# Patient Record
Sex: FEMALE | Race: WHITE | NOT HISPANIC OR LATINO | Employment: OTHER | URBAN - NONMETROPOLITAN AREA
[De-identification: names, ages, dates, MRNs, and addresses within clinical notes are randomized per-mention and may not be internally consistent; named-entity substitution may affect disease eponyms.]

---

## 2018-09-18 ENCOUNTER — OFFICE VISIT (OUTPATIENT)
Dept: ORTHOPEDIC SURGERY | Facility: CLINIC | Age: 63
End: 2018-09-18

## 2018-09-18 VITALS — HEIGHT: 55 IN | WEIGHT: 139 LBS | BODY MASS INDEX: 32.17 KG/M2

## 2018-09-18 DIAGNOSIS — G89.29 CHRONIC RADICULAR LUMBAR PAIN: Primary | ICD-10-CM

## 2018-09-18 DIAGNOSIS — M54.16 CHRONIC RADICULAR LUMBAR PAIN: Primary | ICD-10-CM

## 2018-09-18 DIAGNOSIS — M17.11 PRIMARY OSTEOARTHRITIS OF RIGHT KNEE: ICD-10-CM

## 2018-09-18 DIAGNOSIS — M17.12 PRIMARY OSTEOARTHRITIS OF LEFT KNEE: ICD-10-CM

## 2018-09-18 DIAGNOSIS — M16.11 PRIMARY OSTEOARTHRITIS OF RIGHT HIP: ICD-10-CM

## 2018-09-18 PROCEDURE — 99203 OFFICE O/P NEW LOW 30 MIN: CPT | Performed by: ORTHOPAEDIC SURGERY

## 2018-09-18 RX ORDER — CYCLOBENZAPRINE HCL 10 MG
10 TABLET ORAL 3 TIMES DAILY PRN
COMMUNITY

## 2018-09-18 RX ORDER — SENNOSIDES 8.6 MG
650 CAPSULE ORAL EVERY 8 HOURS PRN
COMMUNITY

## 2018-09-18 NOTE — PROGRESS NOTES
Chief Complaint   Patient presents with   • Right Knee - Pain   • Left Knee - Pain             HPI  The patient is here today for right and left knee pain and discomfort.  The patient states she is been having pain and discomfort that starts in the lumbar spine.  The pain is a dull constant ache that never really gets better.  She denies any history of trauma.  This pain has been going on for at least a couple of years.  The patient states the pain radiates into the hip the buttock and thigh.  The right hip is significantly symptomatic for the patient as well.  Both the knees are developing a varus deformity.  She has difficulty in squatting on the ground because of the effusion in the knees.  She has difficulty going up and down the steps as well.  She states that the combination of the spinal pain, the hip and knee pain is affecting her mobility in a negative fashion.  She states that she would like to be more active and would like to walk for exercise but is unable to do so because of a combination of these joints hurting her.  She has tried anti-inflammatory medication and a brace for the knee which are somewhat helpful in managing the symptoms.  She does not have any risk factors for avascular necrosis.  The patient states that she would like to defer surgical replacement of the knees at this point but she does understand that as her symptoms progress that might be her only option to improve her quality of life.        Allergies   Allergen Reactions   • Codeine Unknown (See Comments)     Unknown          Social History     Social History   • Marital status:      Spouse name: N/A   • Number of children: N/A   • Years of education: N/A     Occupational History   • Not on file.     Social History Main Topics   • Smoking status: Current Every Day Smoker   • Smokeless tobacco: Never Used   • Alcohol use Not on file   • Drug use: Unknown   • Sexual activity: Not on file     Other Topics Concern   • Not on file      Social History Narrative   • No narrative on file       No family history on file.    No past surgical history on file.    No past medical history on file.        There were no vitals filed for this visit.          Review of Systems   Constitutional: Negative.    HENT: Negative.    Eyes: Negative.    Respiratory: Negative.    Cardiovascular: Negative.    Gastrointestinal: Negative.    Endocrine: Negative.    Genitourinary: Negative.    Musculoskeletal: Positive for gait problem and joint swelling.   Skin: Negative.    Allergic/Immunologic: Negative.    Hematological: Negative.    Psychiatric/Behavioral: Negative.            Physical Exam   Constitutional: She is oriented to person, place, and time. She appears well-nourished.   HENT:   Head: Atraumatic.   Eyes: EOM are normal.   Neck: Neck supple.   Cardiovascular: Normal heart sounds and intact distal pulses.    Pulmonary/Chest: Breath sounds normal.   Abdominal: Bowel sounds are normal.   Musculoskeletal: She exhibits edema and tenderness.   Neurological: She is alert and oriented to person, place, and time.   Skin: Skin is dry. Capillary refill takes 2 to 3 seconds.   Psychiatric: She has a normal mood and affect. Her behavior is normal. Judgment and thought content normal.   Nursing note and vitals reviewed.              Joint/Body Part Specific Exam:  bilateral knee. Patient has crepitus throughout range of motion. Positive patellar grind test. Mild effusion. Lachman is negative. Pivot shift is negative. Anterior and posterior drawer signs are negative. Significant joint line tenderness is noted on the medial aspect of the knee. Patient has a varus orientation of the knee. There is fullness and tenderness in the Popliteal fossa. Mild distention of a Popliteal cyst is noted in this location. Range of motion in flexion is from 0- 110 degrees. Neurovascular status is intact.  Dorsalis pedis and posterior tibial artery pulses are palpable. Common peroneal nerve  function is well preserved. Patient's gait is cautious and antalgic. Skin and soft tissues are mildly swollen, consistent with synovitis and effusion. The patient has a significant limp with the first few steps after starting the gait cycle. Getting out of a chair takes a lot of effort due to pain on knee flexion.    right hip. Neurovascular status is intact. Patient has a distant limp on the affected side. Internal and external rotations are associated with pain and discomfort. Anterior joint line pain and tenderness is significant. Stinchfield sign is positive. Figure of 4 sign is positive. Patient is unable to perform an active straight leg raise exam. Greater trochanter is tender. Crossover adduction test is positive. Cross body adduction is limited and painful for the patient. Patient has very significant limp and joint line tenderness anteriorly, posteriorly and medially. Dorsalis pedis and posterior tibial artery pulses are palpable. Common peroneal nerve function is well preserved.    Lumbar Spine: The overlying skin and soft tissues are mildly swollen. Deep tendon reflexes are bilaterally, symmetric and equal.  No long tract signs are noted. There is No evidence of myelopathy. No bowel or bladder deficit noted. Mild spasm of erector spinae muscle is noted. bilaterally sided rotation is associated with pain and discomfort. Straight leg raise test is positive to 60 degrees. Contralateral straight leg raise is negative. Pain radiates to buttock and thigh. Get up and go is slow due to the lumbar pain.No objective motor or sensory function loss is noted.   X-RAY Report:  Images from an outside facility are reviewed.  There is a significant narrowing of the medial joint space of both the knees.  The patellofemoral distance is almost completely obliterated.  A varus deformity of both the knees is noted.  X-rays of the hip show bilateral narrowing of the joint space with osteophyte formation.  There is no evidence  of subchondral collapse of the femoral head.  These images are discussed with the patient and are the bases of my recommendation for nonoperative care at this point.          Diagnostics:            Adela was seen today for pain and pain.    Diagnoses and all orders for this visit:    Chronic radicular lumbar pain    Primary osteoarthritis of right hip    Primary osteoarthritis of left knee    Primary osteoarthritis of right knee    Other orders  -     SCANNED - IMAGING            Procedures          I provided this patient with educational materials regarding exercise and bone health.        Plan: [place] exercise program for the low back pain.    She is already being treated for her osteoporosis.    Tablet diclofenac 75 mg tab 1 by mouth twice a day for pain swelling and discomfort.    Use a supportive brace to the knee to prevent the knees from buckling and giving out.    Intra-articular steroid injection and Synvisc Visco supplementation discussed with the patient at length.    Nonoperative care for the low back pain at this point.    Discussed with the patient about an epidural block with MOHSEN at the pain clinic.    Nonoperative care for the hip pathology at this point.    The patient will eventually need bilateral knee replacement surgery and we have discussed about the risks and benefits of that surgical intervention with the patient as well.  The patient will let me know when she is ready for that intervention.    Follow-up in my office in 6 months for further evaluation.          CC To Yamileth Lugo APRN

## 2018-09-26 PROBLEM — G89.29 CHRONIC RADICULAR LUMBAR PAIN: Status: ACTIVE | Noted: 2018-09-26

## 2018-09-26 PROBLEM — M17.12 PRIMARY OSTEOARTHRITIS OF LEFT KNEE: Status: ACTIVE | Noted: 2018-09-26

## 2018-09-26 PROBLEM — M54.16 CHRONIC RADICULAR LUMBAR PAIN: Status: ACTIVE | Noted: 2018-09-26

## 2018-09-26 PROBLEM — M17.11 PRIMARY OSTEOARTHRITIS OF RIGHT KNEE: Status: ACTIVE | Noted: 2018-09-26

## 2018-09-26 PROBLEM — M16.11 PRIMARY OSTEOARTHRITIS OF RIGHT HIP: Status: ACTIVE | Noted: 2018-09-26

## 2021-07-19 ENCOUNTER — APPOINTMENT (OUTPATIENT)
Dept: GENERAL RADIOLOGY | Facility: HOSPITAL | Age: 66
End: 2021-07-19

## 2021-07-19 ENCOUNTER — APPOINTMENT (OUTPATIENT)
Dept: CT IMAGING | Facility: HOSPITAL | Age: 66
End: 2021-07-19

## 2021-07-19 ENCOUNTER — HOSPITAL ENCOUNTER (INPATIENT)
Facility: HOSPITAL | Age: 66
LOS: 2 days | Discharge: HOME | End: 2021-07-21
Attending: EMERGENCY MEDICINE | Admitting: INTERNAL MEDICINE

## 2021-07-19 DIAGNOSIS — R26.2 DIFFICULTY IN WALKING: ICD-10-CM

## 2021-07-19 DIAGNOSIS — J44.1 COPD EXACERBATION (HCC): Primary | ICD-10-CM

## 2021-07-19 DIAGNOSIS — S22.32XA CLOSED FRACTURE OF ONE RIB OF LEFT SIDE, INITIAL ENCOUNTER: ICD-10-CM

## 2021-07-19 LAB
ALBUMIN SERPL-MCNC: 4.1 G/DL (ref 3.5–5.2)
ALBUMIN/GLOB SERPL: 1.5 G/DL
ALP SERPL-CCNC: 109 U/L (ref 39–117)
ALT SERPL W P-5'-P-CCNC: 22 U/L (ref 1–33)
ANION GAP SERPL CALCULATED.3IONS-SCNC: 8.2 MMOL/L (ref 5–15)
AST SERPL-CCNC: 34 U/L (ref 1–32)
BASOPHILS # BLD AUTO: 0.04 10*3/MM3 (ref 0–0.2)
BASOPHILS NFR BLD AUTO: 0.7 % (ref 0–1.5)
BILIRUB SERPL-MCNC: 0.7 MG/DL (ref 0–1.2)
BUN SERPL-MCNC: 13 MG/DL (ref 8–23)
BUN/CREAT SERPL: 16.7 (ref 7–25)
CALCIUM SPEC-SCNC: 8.1 MG/DL (ref 8.6–10.5)
CHLORIDE SERPL-SCNC: 100 MMOL/L (ref 98–107)
CO2 SERPL-SCNC: 25.8 MMOL/L (ref 22–29)
CREAT SERPL-MCNC: 0.78 MG/DL (ref 0.57–1)
DEPRECATED RDW RBC AUTO: 43.4 FL (ref 37–54)
EOSINOPHIL # BLD AUTO: 0.12 10*3/MM3 (ref 0–0.4)
EOSINOPHIL NFR BLD AUTO: 2.1 % (ref 0.3–6.2)
ERYTHROCYTE [DISTWIDTH] IN BLOOD BY AUTOMATED COUNT: 12.9 % (ref 12.3–15.4)
GFR SERPL CREATININE-BSD FRML MDRD: 74 ML/MIN/1.73
GLOBULIN UR ELPH-MCNC: 2.7 GM/DL
GLUCOSE SERPL-MCNC: 100 MG/DL (ref 65–99)
HCT VFR BLD AUTO: 40.5 % (ref 34–46.6)
HGB BLD-MCNC: 13.3 G/DL (ref 12–15.9)
HOLD SPECIMEN: NORMAL
HOLD SPECIMEN: NORMAL
IMM GRANULOCYTES # BLD AUTO: 0.01 10*3/MM3 (ref 0–0.05)
IMM GRANULOCYTES NFR BLD AUTO: 0.2 % (ref 0–0.5)
LYMPHOCYTES # BLD AUTO: 1.15 10*3/MM3 (ref 0.7–3.1)
LYMPHOCYTES NFR BLD AUTO: 20.5 % (ref 19.6–45.3)
MCH RBC QN AUTO: 30.1 PG (ref 26.6–33)
MCHC RBC AUTO-ENTMCNC: 32.8 G/DL (ref 31.5–35.7)
MCV RBC AUTO: 91.6 FL (ref 79–97)
MONOCYTES # BLD AUTO: 0.45 10*3/MM3 (ref 0.1–0.9)
MONOCYTES NFR BLD AUTO: 8 % (ref 5–12)
NEUTROPHILS NFR BLD AUTO: 3.85 10*3/MM3 (ref 1.7–7)
NEUTROPHILS NFR BLD AUTO: 68.5 % (ref 42.7–76)
NRBC BLD AUTO-RTO: 0 /100 WBC (ref 0–0.2)
NT-PROBNP SERPL-MCNC: 69.7 PG/ML (ref 0–900)
PLATELET # BLD AUTO: 245 10*3/MM3 (ref 140–450)
PMV BLD AUTO: 10.1 FL (ref 6–12)
POTASSIUM SERPL-SCNC: 3.7 MMOL/L (ref 3.5–5.2)
PROT SERPL-MCNC: 6.8 G/DL (ref 6–8.5)
RBC # BLD AUTO: 4.42 10*6/MM3 (ref 3.77–5.28)
SODIUM SERPL-SCNC: 134 MMOL/L (ref 136–145)
TROPONIN T SERPL-MCNC: <0.01 NG/ML (ref 0–0.03)
WBC # BLD AUTO: 5.62 10*3/MM3 (ref 3.4–10.8)
WHOLE BLOOD HOLD SPECIMEN: NORMAL

## 2021-07-19 PROCEDURE — 94640 AIRWAY INHALATION TREATMENT: CPT

## 2021-07-19 PROCEDURE — 71101 X-RAY EXAM UNILAT RIBS/CHEST: CPT

## 2021-07-19 PROCEDURE — 93010 ELECTROCARDIOGRAM REPORT: CPT | Performed by: INTERNAL MEDICINE

## 2021-07-19 PROCEDURE — 99284 EMERGENCY DEPT VISIT MOD MDM: CPT

## 2021-07-19 PROCEDURE — 93005 ELECTROCARDIOGRAM TRACING: CPT | Performed by: EMERGENCY MEDICINE

## 2021-07-19 PROCEDURE — 83880 ASSAY OF NATRIURETIC PEPTIDE: CPT | Performed by: EMERGENCY MEDICINE

## 2021-07-19 PROCEDURE — 99223 1ST HOSP IP/OBS HIGH 75: CPT | Performed by: FAMILY MEDICINE

## 2021-07-19 PROCEDURE — 85025 COMPLETE CBC W/AUTO DIFF WBC: CPT | Performed by: EMERGENCY MEDICINE

## 2021-07-19 PROCEDURE — 80053 COMPREHEN METABOLIC PANEL: CPT | Performed by: EMERGENCY MEDICINE

## 2021-07-19 PROCEDURE — 93005 ELECTROCARDIOGRAM TRACING: CPT

## 2021-07-19 PROCEDURE — 36415 COLL VENOUS BLD VENIPUNCTURE: CPT | Performed by: EMERGENCY MEDICINE

## 2021-07-19 PROCEDURE — 25010000002 METHYLPREDNISOLONE PER 125 MG: Performed by: NURSE PRACTITIONER

## 2021-07-19 PROCEDURE — 25010000002 CEFTRIAXONE PER 250 MG: Performed by: FAMILY MEDICINE

## 2021-07-19 PROCEDURE — 84484 ASSAY OF TROPONIN QUANT: CPT | Performed by: EMERGENCY MEDICINE

## 2021-07-19 PROCEDURE — 94799 UNLISTED PULMONARY SVC/PX: CPT

## 2021-07-19 PROCEDURE — 71250 CT THORAX DX C-: CPT

## 2021-07-19 RX ORDER — TRAMADOL HYDROCHLORIDE 50 MG/1
50 TABLET ORAL EVERY 8 HOURS PRN
COMMUNITY

## 2021-07-19 RX ORDER — TRAMADOL HYDROCHLORIDE 50 MG/1
50 TABLET ORAL EVERY 8 HOURS PRN
Status: DISCONTINUED | OUTPATIENT
Start: 2021-07-19 | End: 2021-07-21 | Stop reason: HOSPADM

## 2021-07-19 RX ORDER — BUDESONIDE 0.5 MG/2ML
0.5 INHALANT ORAL
Status: DISCONTINUED | OUTPATIENT
Start: 2021-07-19 | End: 2021-07-21 | Stop reason: HOSPADM

## 2021-07-19 RX ORDER — AZITHROMYCIN 250 MG/1
TABLET, FILM COATED ORAL
Qty: 6 TABLET | Refills: 0 | Status: SHIPPED | OUTPATIENT
Start: 2021-07-19 | End: 2021-07-19 | Stop reason: ALTCHOICE

## 2021-07-19 RX ORDER — METHYLPREDNISOLONE SODIUM SUCCINATE 125 MG/2ML
125 INJECTION, POWDER, LYOPHILIZED, FOR SOLUTION INTRAMUSCULAR; INTRAVENOUS ONCE
Status: COMPLETED | OUTPATIENT
Start: 2021-07-19 | End: 2021-07-19

## 2021-07-19 RX ORDER — ARFORMOTEROL TARTRATE 15 UG/2ML
15 SOLUTION RESPIRATORY (INHALATION)
Status: DISCONTINUED | OUTPATIENT
Start: 2021-07-19 | End: 2021-07-21 | Stop reason: HOSPADM

## 2021-07-19 RX ORDER — METHYLPREDNISOLONE 4 MG/1
TABLET ORAL
Qty: 21 EACH | Refills: 0 | Status: SHIPPED | OUTPATIENT
Start: 2021-07-19 | End: 2021-07-19 | Stop reason: ALTCHOICE

## 2021-07-19 RX ORDER — BENZONATATE 100 MG/1
100 CAPSULE ORAL 3 TIMES DAILY PRN
Status: ON HOLD | COMMUNITY
End: 2021-07-21 | Stop reason: SDUPTHER

## 2021-07-19 RX ORDER — PREDNISONE 10 MG/1
40 TABLET ORAL
Status: DISCONTINUED | OUTPATIENT
Start: 2021-07-20 | End: 2021-07-21 | Stop reason: HOSPADM

## 2021-07-19 RX ORDER — ASPIRIN 81 MG/1
81 TABLET, CHEWABLE ORAL DAILY
COMMUNITY

## 2021-07-19 RX ORDER — BENZONATATE 100 MG/1
100 CAPSULE ORAL 3 TIMES DAILY PRN
Status: DISCONTINUED | OUTPATIENT
Start: 2021-07-19 | End: 2021-07-21 | Stop reason: HOSPADM

## 2021-07-19 RX ORDER — SODIUM CHLORIDE 0.9 % (FLUSH) 0.9 %
10 SYRINGE (ML) INJECTION AS NEEDED
Status: DISCONTINUED | OUTPATIENT
Start: 2021-07-19 | End: 2021-07-21 | Stop reason: HOSPADM

## 2021-07-19 RX ORDER — IPRATROPIUM BROMIDE AND ALBUTEROL SULFATE 2.5; .5 MG/3ML; MG/3ML
3 SOLUTION RESPIRATORY (INHALATION) EVERY 4 HOURS PRN
Status: DISCONTINUED | OUTPATIENT
Start: 2021-07-19 | End: 2021-07-20

## 2021-07-19 RX ORDER — ACETAMINOPHEN 325 MG/1
975 TABLET ORAL ONCE
Status: COMPLETED | OUTPATIENT
Start: 2021-07-19 | End: 2021-07-19

## 2021-07-19 RX ORDER — IPRATROPIUM BROMIDE AND ALBUTEROL SULFATE 2.5; .5 MG/3ML; MG/3ML
3 SOLUTION RESPIRATORY (INHALATION) ONCE
Status: COMPLETED | OUTPATIENT
Start: 2021-07-19 | End: 2021-07-19

## 2021-07-19 RX ADMIN — IPRATROPIUM BROMIDE AND ALBUTEROL SULFATE 3 ML: .5; 2.5 SOLUTION RESPIRATORY (INHALATION) at 14:53

## 2021-07-19 RX ADMIN — ACETAMINOPHEN 975 MG: 325 TABLET ORAL at 14:58

## 2021-07-19 RX ADMIN — METHYLPREDNISOLONE SODIUM SUCCINATE 125 MG: 125 INJECTION, POWDER, FOR SOLUTION INTRAMUSCULAR; INTRAVENOUS at 14:58

## 2021-07-19 RX ADMIN — IPRATROPIUM BROMIDE AND ALBUTEROL SULFATE 3 ML: .5; 2.5 SOLUTION RESPIRATORY (INHALATION) at 18:14

## 2021-07-19 RX ADMIN — TRAMADOL HYDROCHLORIDE 50 MG: 50 TABLET, FILM COATED ORAL at 23:13

## 2021-07-19 RX ADMIN — CEFTRIAXONE 1 G: 10 INJECTION, POWDER, FOR SOLUTION INTRAVENOUS at 22:38

## 2021-07-19 NOTE — ED PROVIDER NOTES
Subjective   66-year-old female with a history of COPD, osteoarthritis, gastric ulcers, anemia, rib fractures, brain aneurysm, partial colectomy who presents with a 4-day history of shortness of breath and left rib pain after coughing.  Patient states her cough is nonproductive.  She complains of left chest wall pain and her ribs which is how she felt in the past with rib fractures from coughing.  Patient denies fever, sore throat, hematuria, abdominal pain, dysuria, nausea, vomiting, extremity swelling, or other complaint.  Patient smokes 15 cigarettes a day.  Patient denies alcohol use.      History provided by:  Patient   used: No    Chest Pain  Pain location:  L chest  Pain quality: sharp    Pain radiates to:  L shoulder  Pain severity:  Moderate  Onset quality:  Sudden  Timing:  Constant  Chronicity:  New  Context: breathing    Relieved by:  Nothing  Worsened by:  Coughing  Associated symptoms: cough    Associated symptoms: no abdominal pain, no back pain, no dizziness, no fever, no headache, no nausea, no palpitations and no vomiting        Review of Systems   Constitutional: Negative for chills and fever.   HENT: Negative for rhinorrhea and sore throat.    Respiratory: Positive for cough. Negative for wheezing and stridor.    Cardiovascular: Positive for chest pain. Negative for palpitations and leg swelling.   Gastrointestinal: Negative for abdominal pain, diarrhea, nausea and vomiting.   Genitourinary: Negative for dysuria and flank pain.   Musculoskeletal: Negative for back pain and myalgias.   Skin: Negative for rash and wound.   Neurological: Negative for dizziness and headaches.   Psychiatric/Behavioral: Negative for sleep disturbance and suicidal ideas.       Past Medical History:   Diagnosis Date   • Anemia    • Arthritis    • Bleeding gastric ulcer    • Brain aneurysm    • H/O cerebral aneurysm repair    • Osteoporosis        Allergies   Allergen Reactions   • Codeine Unknown (See  Comments)     Unknown        Past Surgical History:   Procedure Laterality Date   • CHOLECYSTECTOMY     • COLON SURGERY         History reviewed. No pertinent family history.    Social History     Socioeconomic History   • Marital status:      Spouse name: Not on file   • Number of children: Not on file   • Years of education: Not on file   • Highest education level: Not on file   Tobacco Use   • Smoking status: Current Every Day Smoker     Packs/day: 0.50     Types: Cigars   • Smokeless tobacco: Never Used   Substance and Sexual Activity   • Alcohol use: Never   • Drug use: Never           Objective   Physical Exam  Constitutional:       Appearance: Normal appearance.   HENT:      Head: Normocephalic and atraumatic.      Nose: Nose normal.   Eyes:      Extraocular Movements: Extraocular movements intact.      Pupils: Pupils are equal, round, and reactive to light.   Cardiovascular:      Rate and Rhythm: Normal rate and regular rhythm.   Pulmonary:      Effort: Pulmonary effort is normal.      Comments: Diminished, wheeze noted  Chest:      Chest wall: Tenderness (left rib cage diffusely) present.   Abdominal:      General: Bowel sounds are normal.      Palpations: Abdomen is soft.   Musculoskeletal:         General: Normal range of motion.      Cervical back: Normal range of motion.      Right lower leg: No edema.      Left lower leg: No edema.   Skin:     General: Skin is warm.   Neurological:      General: No focal deficit present.      Mental Status: She is alert and oriented to person, place, and time.   Psychiatric:         Mood and Affect: Mood normal.         Behavior: Behavior normal.         Thought Content: Thought content normal.         Judgment: Judgment normal.         Procedures           ED Course  ED Course as of Jul 19 1755   Mon Jul 19, 2021   1447 XR Ribs Left With PA Chest [RM]   1455 XR Ribs Left With PA Chest [RM]   1520 XR Ribs Left With PA Chest [RM]   1521 XR Ribs Left With PA Chest  [RM]      ED Course User Index  [RM] Delmis Cortes, APRN      PROCEDURE:  XR RIBS LEFT W PA CHEST     COMPARISON: None     INDICATIONS:  generalized left rib pain after coughing, osteoporosis, hx of rib fx     FINDINGS:             There are multiple subacute to chronic appearing left rib fractures.  No evidence of pneumothorax.    There is a small left pleural effusion with mild airspace consolidation in the left lung base.  The   heart and pulmonary vasculature are within normal limits.     IMPRESSION:  1. Multiple subacute to chronic appearing left rib fractures.     2. Small left pleural effusion.  Mild consolidation in the left lung base.  No prior examinations   available for comparison.  Suggest a CT scan of the chest for further evaluation.       ROBERT BUSTAMANTE MD         Electronically Signed and Approved By: ROBERT BUSTAMANTE MD on 7/19/2021 at 15:03                COMPARISON: [facility], CR, XR RIBS LEFT W PA CHEST, 7/19/2021, 14:42.     INDICATIONS:  Abnormal xray - pleural effusion     PROTOCOL:     Standard imaging protocol performed                 RADIATION:                     Automated exposure control was utilized to minimize radiation dose.      TECHNIQUE:    Axial images of the chest without intravenous contrast.     FINDINGS:  There is a minimal left pleural effusion.  There is mild airspace consolidation in medial right   upper lobe and right middle lobe likely areas of scarring.  There is airspace consolidation with   air bronchograms in the left lower lobe.  There is opacification of left lower lobe bronchi.    Calcified left hilar nodes are present.  There is no mediastinal, axillary or hilar adenopathy.    Prior cholecystectomy.  There are multiple old left rib fractures.  There is an acute left 8th rib   fracture.  There are peripheral areas of scarring in the upper lung fields.     IMPRESSION:      1. Acute left 8th rib fracture.  Multiple old left rib fractures.     2.  Minimal left pleural effusion.  Airspace consolidation with air bronchograms in the left lower   lobe.  Opacification of left lobe bronchi.  This may be secondary to pneumonia.  Recommend a   follow-up CT scan of the chest after treatment to confirm resolution.  Consider bronchoscopy if the   abnormality persists on the follow-up exam.     ROBERT BUSTAMANTE MD         Electronically Signed and Approved By: ROBERT BUSTAMANTE MD on 7/19/2021 at 16:40                      Updates:  1525 spoke with the patient regarding abnormal x-ray imaging.  Discussed the benefit of CT imaging.  Patient agreeable with plan. Patient states feels improved after breathing treatment    1720  Upon reevaluation, oxgyen saturations 88%,. Additional breathing treatment ordered. Will admit to hospitalist.       1753: Spoke with Dr. Davis, hospitalist.  Discussed patient's history, findings, work-up, and treatment in the ED.  He agrees admit to [facility].  He has no questions or further recommendations.                                       MDM  Number of Diagnoses or Management Options     Amount and/or Complexity of Data Reviewed  Clinical lab tests: reviewed  Tests in the medicine section of CPT®: reviewed    Risk of Complications, Morbidity, and/or Mortality  Presenting problems: moderate  Diagnostic procedures: low  Management options: moderate    Patient Progress  Patient progress: stable      Final diagnoses:   COPD exacerbation (CMS/HCC)   Closed fracture of one rib of left side, initial encounter       ED Disposition  ED Disposition     ED Disposition Condition Comment    Decision to Admit  Level of Care: Med/Surg [1]   Diagnosis: COPD exacerbation (CMS/HCC) [907750]   Admitting Physician: CIERRA DAVIS [[id number]]   Bed Request Comments: copd exacerbation, left rib fx   Certification: I Certify That Inpatient Hospital Services Are Medically Necessary For Greater Than 2 Midnights            No follow-up provider specified.        Medication List      No changes were made to your prescriptions during this visit.          Delmis Cotres, RAHUL  07/19/21 1754           Delmis Cortes, RAHUL  07/19/21 1751    Final diagnoses:   COPD exacerbation (CMS/[facility])   Closed fracture of one rib of left side, initial encounter            Delmis Cortes, RAHUL  07/26/21 0712

## 2021-07-20 ENCOUNTER — APPOINTMENT (OUTPATIENT)
Dept: CT IMAGING | Facility: HOSPITAL | Age: 66
End: 2021-07-20

## 2021-07-20 LAB
ANION GAP SERPL CALCULATED.3IONS-SCNC: 9.3 MMOL/L (ref 5–15)
BASOPHILS # BLD AUTO: 0.01 10*3/MM3 (ref 0–0.2)
BASOPHILS NFR BLD AUTO: 0.2 % (ref 0–1.5)
BUN SERPL-MCNC: 15 MG/DL (ref 8–23)
BUN/CREAT SERPL: 18.1 (ref 7–25)
CALCIUM SPEC-SCNC: 8.6 MG/DL (ref 8.6–10.5)
CHLORIDE SERPL-SCNC: 104 MMOL/L (ref 98–107)
CO2 SERPL-SCNC: 23.7 MMOL/L (ref 22–29)
CREAT SERPL-MCNC: 0.83 MG/DL (ref 0.57–1)
DEPRECATED RDW RBC AUTO: 41.4 FL (ref 37–54)
EOSINOPHIL # BLD AUTO: 0 10*3/MM3 (ref 0–0.4)
EOSINOPHIL NFR BLD AUTO: 0 % (ref 0.3–6.2)
ERYTHROCYTE [DISTWIDTH] IN BLOOD BY AUTOMATED COUNT: 12.6 % (ref 12.3–15.4)
GFR SERPL CREATININE-BSD FRML MDRD: 69 ML/MIN/1.73
GLUCOSE SERPL-MCNC: 192 MG/DL (ref 65–99)
HCT VFR BLD AUTO: 38.9 % (ref 34–46.6)
HGB BLD-MCNC: 13.2 G/DL (ref 12–15.9)
IMM GRANULOCYTES # BLD AUTO: 0.02 10*3/MM3 (ref 0–0.05)
IMM GRANULOCYTES NFR BLD AUTO: 0.4 % (ref 0–0.5)
L PNEUMO1 AG UR QL IA: NEGATIVE
LYMPHOCYTES # BLD AUTO: 0.49 10*3/MM3 (ref 0.7–3.1)
LYMPHOCYTES NFR BLD AUTO: 8.8 % (ref 19.6–45.3)
MCH RBC QN AUTO: 30.8 PG (ref 26.6–33)
MCHC RBC AUTO-ENTMCNC: 33.9 G/DL (ref 31.5–35.7)
MCV RBC AUTO: 90.7 FL (ref 79–97)
MONOCYTES # BLD AUTO: 0.3 10*3/MM3 (ref 0.1–0.9)
MONOCYTES NFR BLD AUTO: 5.4 % (ref 5–12)
NEUTROPHILS NFR BLD AUTO: 4.75 10*3/MM3 (ref 1.7–7)
NEUTROPHILS NFR BLD AUTO: 85.2 % (ref 42.7–76)
NRBC BLD AUTO-RTO: 0 /100 WBC (ref 0–0.2)
PLATELET # BLD AUTO: 249 10*3/MM3 (ref 140–450)
PMV BLD AUTO: 10.2 FL (ref 6–12)
POTASSIUM SERPL-SCNC: 4.6 MMOL/L (ref 3.5–5.2)
PROCALCITONIN SERPL-MCNC: 0.03 NG/ML (ref 0–0.25)
RBC # BLD AUTO: 4.29 10*6/MM3 (ref 3.77–5.28)
S PNEUM AG SPEC QL LA: NEGATIVE
SODIUM SERPL-SCNC: 137 MMOL/L (ref 136–145)
WBC # BLD AUTO: 5.57 10*3/MM3 (ref 3.4–10.8)

## 2021-07-20 PROCEDURE — 80048 BASIC METABOLIC PNL TOTAL CA: CPT | Performed by: FAMILY MEDICINE

## 2021-07-20 PROCEDURE — 94799 UNLISTED PULMONARY SVC/PX: CPT

## 2021-07-20 PROCEDURE — 25010000002 CEFTRIAXONE PER 250 MG: Performed by: FAMILY MEDICINE

## 2021-07-20 PROCEDURE — 85025 COMPLETE CBC W/AUTO DIFF WBC: CPT | Performed by: FAMILY MEDICINE

## 2021-07-20 PROCEDURE — 25010000002 AZITHROMYCIN PER 500 MG: Performed by: INTERNAL MEDICINE

## 2021-07-20 PROCEDURE — 63710000001 PREDNISONE PER 5 MG: Performed by: FAMILY MEDICINE

## 2021-07-20 PROCEDURE — 99233 SBSQ HOSP IP/OBS HIGH 50: CPT | Performed by: INTERNAL MEDICINE

## 2021-07-20 PROCEDURE — 74176 CT ABD & PELVIS W/O CONTRAST: CPT

## 2021-07-20 PROCEDURE — 87899 AGENT NOS ASSAY W/OPTIC: CPT | Performed by: INTERNAL MEDICINE

## 2021-07-20 PROCEDURE — 84145 PROCALCITONIN (PCT): CPT | Performed by: INTERNAL MEDICINE

## 2021-07-20 RX ORDER — ASPIRIN 81 MG/1
81 TABLET, CHEWABLE ORAL DAILY
Status: DISCONTINUED | OUTPATIENT
Start: 2021-07-20 | End: 2021-07-21 | Stop reason: HOSPADM

## 2021-07-20 RX ORDER — MORPHINE SULFATE 2 MG/ML
2 INJECTION, SOLUTION INTRAMUSCULAR; INTRAVENOUS ONCE
Status: DISCONTINUED | OUTPATIENT
Start: 2021-07-20 | End: 2021-07-21 | Stop reason: HOSPADM

## 2021-07-20 RX ORDER — CYCLOBENZAPRINE HCL 10 MG
10 TABLET ORAL 3 TIMES DAILY PRN
Status: DISCONTINUED | OUTPATIENT
Start: 2021-07-20 | End: 2021-07-21 | Stop reason: HOSPADM

## 2021-07-20 RX ORDER — ALBUTEROL SULFATE 2.5 MG/3ML
2.5 SOLUTION RESPIRATORY (INHALATION) EVERY 6 HOURS PRN
Status: DISCONTINUED | OUTPATIENT
Start: 2021-07-20 | End: 2021-07-21 | Stop reason: HOSPADM

## 2021-07-20 RX ORDER — IPRATROPIUM BROMIDE AND ALBUTEROL SULFATE 2.5; .5 MG/3ML; MG/3ML
3 SOLUTION RESPIRATORY (INHALATION)
Status: DISCONTINUED | OUTPATIENT
Start: 2021-07-20 | End: 2021-07-21 | Stop reason: HOSPADM

## 2021-07-20 RX ADMIN — ASPIRIN 81 MG CHEWABLE TABLET 81 MG: 81 TABLET CHEWABLE at 10:27

## 2021-07-20 RX ADMIN — ARFORMOTEROL TARTRATE 15 MCG: 15 SOLUTION RESPIRATORY (INHALATION) at 09:31

## 2021-07-20 RX ADMIN — BUDESONIDE 0.5 MG: 0.5 INHALANT ORAL at 19:35

## 2021-07-20 RX ADMIN — ARFORMOTEROL TARTRATE 15 MCG: 15 SOLUTION RESPIRATORY (INHALATION) at 19:35

## 2021-07-20 RX ADMIN — BENZONATATE 100 MG: 100 CAPSULE ORAL at 23:24

## 2021-07-20 RX ADMIN — TRAMADOL HYDROCHLORIDE 50 MG: 50 TABLET, FILM COATED ORAL at 16:47

## 2021-07-20 RX ADMIN — BUDESONIDE 0.5 MG: 0.5 INHALANT ORAL at 09:31

## 2021-07-20 RX ADMIN — CYCLOBENZAPRINE 10 MG: 10 TABLET, FILM COATED ORAL at 19:53

## 2021-07-20 RX ADMIN — IPRATROPIUM BROMIDE AND ALBUTEROL SULFATE 3 ML: .5; 2.5 SOLUTION RESPIRATORY (INHALATION) at 19:35

## 2021-07-20 RX ADMIN — AZITHROMYCIN 500 MG: 500 INJECTION, POWDER, LYOPHILIZED, FOR SOLUTION INTRAVENOUS at 10:26

## 2021-07-20 RX ADMIN — Medication 1 TABLET: at 10:27

## 2021-07-20 RX ADMIN — PREDNISONE 40 MG: 10 TABLET ORAL at 08:45

## 2021-07-20 RX ADMIN — IPRATROPIUM BROMIDE AND ALBUTEROL SULFATE 3 ML: .5; 2.5 SOLUTION RESPIRATORY (INHALATION) at 14:44

## 2021-07-20 RX ADMIN — CEFTRIAXONE 1 G: 10 INJECTION, POWDER, FOR SOLUTION INTRAVENOUS at 23:22

## 2021-07-20 NOTE — PLAN OF CARE
Goal Outcome Evaluation:      Patient was an admission from ED; vital signs stable upon. Reports pain on left side due to broken rib. Received tramadol for pain, reported that it helped. Applied kpad to assist with comfort. Patient very pleasant.

## 2021-07-20 NOTE — PROGRESS NOTES
[facility]   Hospitalist Progress Note  Date: 2021  Patient Name: Adela Nicole  : 1955  MRN: [id number]  Date of admission: 2021      Subjective   Subjective     Chief Complaint: SOA     Summary: 66 y.o. female with a history of COPD, osteoarthritis, gastric ulcers, anemia, rib fractures, brain aneurysm, partial colectomy who presents with a 4-5 day history of shortness of breath and left rib pain associated with coughing.  Patient states her cough is productive of light yellow sputum.  She complains of left chest wall pain and her ribs which is how she felt in the past with rib fractures from coughing.  She states that few days ago she was reaching for some blackberries and overextended her arm and felt a pop in her ribs where she is now feeling pain.  Pain occurs now every time she coughs. Patient denies fever, sore throat, hematuria, abdominal pain, dysuria, nausea, vomiting, extremity swelling, or other complaint.  Patient smokes 15 cigarettes a day.  Patient has not had the Covid vaccine but has not had any known exposures to COVID-19. On arrival to the ED she was found to be hypoxic on    Interval Followup: No events overnight.  Patient is upset that someone told her she would need oxygen to go home with.  She also does not think she has COPD and is now only taking nebulizers or inhalers at discharge.  States her rib pain is slightly improved with a heating pad.  She does complain of intermittent abdominal pain.  She states it is worse when she is seem to have a bowel movement, feels like a squeezing cramping pain in the center of her abdomen which slowly improves after 3-5 minutes.  Otherwise no new complaints.    Review of Systems   No nausea or vomiting    Objective   Objective     Vitals:   Temp:  [97.3 °F (36.3 °C)-98.5 °F (36.9 °C)] 97.3 °F (36.3 °C)  Heart Rate:  [74-87] 83  Resp:  [18-20] 18  BP: ()/(39-75) 108/39  Flow (L/min):  [1-2] 1  Physical  Exam    Constitutional: Awake, alert, no acute distress   Eyes: Pupils equal, sclerae anicteric, no conjunctival injection   HENT: NCAT, mucous membranes moist   Neck: Supple, no thyromegaly, no lymphadenopathy, trachea midline   Respiratory: Expiratory wheezing noted in bilateral bases, otherwise clear to auscultation bilaterally, nonlabored respirations    Cardiovascular: RRR, no murmurs, rubs, or gallops, palpable pedal pulses bilaterally   Gastrointestinal: Positive bowel sounds, soft, nontender, nondistended   Musculoskeletal: No bilateral ankle edema, no clubbing or cyanosis to extremities   Psychiatric: Appropriate affect, cooperative   Neurologic: Oriented x 3, strength symmetric in all extremities, Cranial Nerves grossly intact to confrontation, speech clear   Skin: No rashes     Result Review    Result Review:  I have personally reviewed the results from the time of this admission to 7/20/2021 16:05 EDT and agree with these findings:  [x]  Laboratory  [x]  Microbiology  [x]  Radiology  []  EKG/Telemetry   []  Cardiology/Vascular   []  Pathology  []  Old records  []  Other:    Assessment/Plan   Assessment / Plan     Assessment/Plan:  Acute hypoxia  COPD with acute exacerbation  Rib fracture  Intractable pain    Continue to monitor in the hospital for work-up and management of the above  Continue Rocephin and azithromycin, check procalcitonin, strep and Legionella urinary antigens, sputum culture  Continue scheduled bronchodilators, Pulmicort and Brovana twice daily, albuterol as needed  Continue prednisone 40 g daily  Incentive spirometer ordered, encouraged frequent use  Wean O2 to keep sats greater than 90%  Can use tramadol and heating pad as needed for pain  Obtain CT abdomen pelvis due to abdominal pain  Continue appropriate home medications  Trend renal function and electrolytes with a.m. BMP  Trend Hgb and WBC with a.m. CBC  Clinical course will dictate further management    Discussed plan with  RN.    DVT prophylaxis:  No DVT prophylaxis order currently exists.    CODE STATUS:   Code Status: CPR  Medical Interventions (Level of Support Prior to Arrest): Full      Electronically signed by Rolando Driscoll MD, 07/20/21, 4:05 PM EDT.

## 2021-07-20 NOTE — PLAN OF CARE
Goal Outcome Evaluation:  Plan of Care Reviewed With: patient        Progress: improving  Outcome Summary: pt aox4, weaned to room air today, educated on IS use. VS stable. tramadol and flexeril used for pain.

## 2021-07-20 NOTE — H&P
[facility]  Date: 2021   Patient Name: Adela Nicole  : 1955  MRN: [id number]  Primary Care Physician:  Yamileth Lugo APRN  Date of admission: 2021    Subjective   Subjective     Chief Complaint: Shortness of breath and rib pain    HPI:    Adela Nicole is a 66 y.o. female with a history of COPD, osteoarthritis, gastric ulcers, anemia, rib fractures, brain aneurysm, partial colectomy who presents with a 4-5 day history of shortness of breath and left rib pain associated with coughing.  Patient states her cough is productive of light yellow sputum.  She complains of left chest wall pain and her ribs which is how she felt in the past with rib fractures from coughing.  She states that few days ago she was reaching for some blackberries and overextended her arm and felt a pop in her ribs where she is now feeling pain.  Pain occurs now every time she coughs. Patient denies fever, sore throat, hematuria, abdominal pain, dysuria, nausea, vomiting, extremity swelling, or other complaint.  Patient smokes 15 cigarettes a day.  Patient has not had the Covid vaccine but has not had any known exposures to COVID-19.      Personal History     Past Medical History:  Past Medical History:   Diagnosis Date   • Anemia    • Arthritis    • Bleeding gastric ulcer    • Brain aneurysm    • H/O cerebral aneurysm repair    • Osteoporosis          Past Surgical History:  Past Surgical History:   Procedure Laterality Date   • CHOLECYSTECTOMY     • COLON SURGERY         Family History:   Hypertension    Social History:   Social History     Tobacco Use   • Smoking status: Current Every Day Smoker     Packs/day: 0.50     Types: Cigars   • Smokeless tobacco: Never Used   Vaping Use   • Vaping Use: Never used   Substance Use Topics   • Alcohol use: Never   • Drug use: Never         Home Medications:  Calcium Citrate-Vitamin D3, Denosumab, acetaminophen, aspirin,  benzonatate, cyclobenzaprine, diclofenac sodium, and traMADol    Allergies:  Allergies   Allergen Reactions   • Codeine Unknown (See Comments)     Unknown        Review of Systems   All systems were reviewed and negative except for: Noted above or in HPI    Objective   Objective     Vitals:   Temp:  [98.6 °F (37 °C)] 98.6 °F (37 °C)  Heart Rate:  [76-89] 80  Resp:  [20-21] 20  BP: ()/(51-75) 96/57  Flow (L/min):  [2] 2    Physical Exam    Constitutional: Awake, alert, no acute distress   Eyes: Pupils equal, sclerae anicteric, no conjunctival injection   HENT: NCAT, mucous membranes moist   Neck: Supple, no thyromegaly, no lymphadenopathy, trachea midline   Respiratory: Clear to auscultation bilaterally, nonlabored respirations    Cardiovascular: RRR, no murmurs, rubs, or gallops, palpable pedal pulses bilaterally   Gastrointestinal: Positive bowel sounds, soft, nontender, nondistended   Musculoskeletal: No bilateral ankle edema, no clubbing or cyanosis to extremities   Psychiatric: Appropriate affect, cooperative   Neurologic: Oriented x 3, strength symmetric in all extremities, Cranial Nerves grossly intact to confrontation, speech clear   Skin: No rashes     Result Review    Result Review:  I have personally reviewed the results from the time of this admission to 7/19/2021 20:04 EDT and agree with these findings:  [x]  Laboratory  []  Microbiology  [x]  Radiology  [x]  EKG/Telemetry   []  Cardiology/Vascular   []  Pathology  []  Old records  []  Other:      Assessment/Plan   Assessment / Plan     Assessment/Plan:   • Acute Exacerbation of COPD   • Acute hypoxic respiratory failure   • Rib fracture    Plan:  Patient is admitted to a monitored bed for further medical management  The patient will be treated with IV antibiotics (Rocephin)  Inhaled bronchodilators  Systemic corticosteroids  Supplemental oxygen. Titrate oxygen for saturation of 92 % or greater.    Pulmonary hygiene protocol.    I reviewed the  patient's chest imaging   Follow CBC for surveillance of acute blood loss or thrombocytopenia   Metabolic panel in the morning to evaluate electrolytes and renal function  Reviewed today's labs  Reviewed telemetry  Discussed with ER physician  Risk of primary complaint: patient is at significant risk of morbidity if primary complaint is not treated especially considering the patient's comorbidities.  DVT prophylaxis  CODE STATUS:    Code Status: CPR  Medical Interventions (Level of Support Prior to Arrest): Full      Admission Status:  I believe this patient meets inpatient status.    Electronically signed by Phan Davis DO, 07/19/21, 8:04 PM EDT.

## 2021-07-21 VITALS
OXYGEN SATURATION: 94 % | TEMPERATURE: 97.7 F | SYSTOLIC BLOOD PRESSURE: 104 MMHG | BODY MASS INDEX: 22.55 KG/M2 | DIASTOLIC BLOOD PRESSURE: 50 MMHG | RESPIRATION RATE: 20 BRPM | WEIGHT: 132.06 LBS | HEART RATE: 78 BPM | HEIGHT: 64 IN

## 2021-07-21 LAB
ANION GAP SERPL CALCULATED.3IONS-SCNC: 9.3 MMOL/L (ref 5–15)
BASOPHILS # BLD AUTO: 0.03 10*3/MM3 (ref 0–0.2)
BASOPHILS NFR BLD AUTO: 0.4 % (ref 0–1.5)
BUN SERPL-MCNC: 16 MG/DL (ref 8–23)
BUN/CREAT SERPL: 19.8 (ref 7–25)
CALCIUM SPEC-SCNC: 8.5 MG/DL (ref 8.6–10.5)
CHLORIDE SERPL-SCNC: 107 MMOL/L (ref 98–107)
CO2 SERPL-SCNC: 23.7 MMOL/L (ref 22–29)
CREAT SERPL-MCNC: 0.81 MG/DL (ref 0.57–1)
DEPRECATED RDW RBC AUTO: 43.1 FL (ref 37–54)
EOSINOPHIL # BLD AUTO: 0.05 10*3/MM3 (ref 0–0.4)
EOSINOPHIL NFR BLD AUTO: 0.7 % (ref 0.3–6.2)
ERYTHROCYTE [DISTWIDTH] IN BLOOD BY AUTOMATED COUNT: 13 % (ref 12.3–15.4)
GFR SERPL CREATININE-BSD FRML MDRD: 71 ML/MIN/1.73
GLUCOSE SERPL-MCNC: 96 MG/DL (ref 65–99)
HCT VFR BLD AUTO: 35.9 % (ref 34–46.6)
HGB BLD-MCNC: 11.9 G/DL (ref 12–15.9)
IMM GRANULOCYTES # BLD AUTO: 0.02 10*3/MM3 (ref 0–0.05)
IMM GRANULOCYTES NFR BLD AUTO: 0.3 % (ref 0–0.5)
LYMPHOCYTES # BLD AUTO: 1.41 10*3/MM3 (ref 0.7–3.1)
LYMPHOCYTES NFR BLD AUTO: 18.9 % (ref 19.6–45.3)
MAGNESIUM SERPL-MCNC: 2.1 MG/DL (ref 1.6–2.4)
MCH RBC QN AUTO: 30.4 PG (ref 26.6–33)
MCHC RBC AUTO-ENTMCNC: 33.1 G/DL (ref 31.5–35.7)
MCV RBC AUTO: 91.6 FL (ref 79–97)
MONOCYTES # BLD AUTO: 0.51 10*3/MM3 (ref 0.1–0.9)
MONOCYTES NFR BLD AUTO: 6.8 % (ref 5–12)
NEUTROPHILS NFR BLD AUTO: 5.44 10*3/MM3 (ref 1.7–7)
NEUTROPHILS NFR BLD AUTO: 72.9 % (ref 42.7–76)
NRBC BLD AUTO-RTO: 0 /100 WBC (ref 0–0.2)
PLATELET # BLD AUTO: 237 10*3/MM3 (ref 140–450)
PMV BLD AUTO: 10 FL (ref 6–12)
POTASSIUM SERPL-SCNC: 3.8 MMOL/L (ref 3.5–5.2)
QT INTERVAL: 390 MS
RBC # BLD AUTO: 3.92 10*6/MM3 (ref 3.77–5.28)
SODIUM SERPL-SCNC: 140 MMOL/L (ref 136–145)
WBC # BLD AUTO: 7.46 10*3/MM3 (ref 3.4–10.8)

## 2021-07-21 PROCEDURE — 94799 UNLISTED PULMONARY SVC/PX: CPT

## 2021-07-21 PROCEDURE — 83735 ASSAY OF MAGNESIUM: CPT | Performed by: INTERNAL MEDICINE

## 2021-07-21 PROCEDURE — 80048 BASIC METABOLIC PNL TOTAL CA: CPT | Performed by: INTERNAL MEDICINE

## 2021-07-21 PROCEDURE — 63710000001 PREDNISONE PER 5 MG: Performed by: FAMILY MEDICINE

## 2021-07-21 PROCEDURE — 97161 PT EVAL LOW COMPLEX 20 MIN: CPT

## 2021-07-21 PROCEDURE — 25010000002 AZITHROMYCIN PER 500 MG: Performed by: INTERNAL MEDICINE

## 2021-07-21 PROCEDURE — 99239 HOSP IP/OBS DSCHRG MGMT >30: CPT | Performed by: INTERNAL MEDICINE

## 2021-07-21 PROCEDURE — 85025 COMPLETE CBC W/AUTO DIFF WBC: CPT | Performed by: INTERNAL MEDICINE

## 2021-07-21 RX ORDER — PREDNISONE 20 MG/1
40 TABLET ORAL
Qty: 10 TABLET | Refills: 0 | Status: SHIPPED | OUTPATIENT
Start: 2021-07-22 | End: 2021-07-27

## 2021-07-21 RX ORDER — ALBUTEROL SULFATE 90 UG/1
2 AEROSOL, METERED RESPIRATORY (INHALATION)
Status: DISCONTINUED | OUTPATIENT
Start: 2021-07-21 | End: 2022-04-07

## 2021-07-21 RX ORDER — NICOTINE 21 MG/24HR
1 PATCH, TRANSDERMAL 24 HOURS TRANSDERMAL
Qty: 7 PATCH | Refills: 0 | Status: SHIPPED | OUTPATIENT
Start: 2021-07-22 | End: 2022-04-07

## 2021-07-21 RX ORDER — BENZONATATE 100 MG/1
100 CAPSULE ORAL 3 TIMES DAILY PRN
Qty: 45 CAPSULE | Refills: 0 | Status: SHIPPED | OUTPATIENT
Start: 2021-07-21 | End: 2022-04-07

## 2021-07-21 RX ORDER — NICOTINE 21 MG/24HR
1 PATCH, TRANSDERMAL 24 HOURS TRANSDERMAL
Status: DISCONTINUED | OUTPATIENT
Start: 2021-07-21 | End: 2021-07-21 | Stop reason: HOSPADM

## 2021-07-21 RX ORDER — LEVOFLOXACIN 750 MG/1
750 TABLET ORAL DAILY
Qty: 4 TABLET | Refills: 0 | Status: SHIPPED | OUTPATIENT
Start: 2021-07-21 | End: 2022-04-07

## 2021-07-21 RX ORDER — ALBUTEROL SULFATE 2.5 MG/3ML
2.5 SOLUTION RESPIRATORY (INHALATION) EVERY 6 HOURS PRN
Qty: 120 EACH | Refills: 0 | Status: SHIPPED | OUTPATIENT
Start: 2021-07-21 | End: 2022-04-07

## 2021-07-21 RX ADMIN — TRAMADOL HYDROCHLORIDE 50 MG: 50 TABLET, FILM COATED ORAL at 09:31

## 2021-07-21 RX ADMIN — IPRATROPIUM BROMIDE AND ALBUTEROL SULFATE 3 ML: .5; 2.5 SOLUTION RESPIRATORY (INHALATION) at 06:14

## 2021-07-21 RX ADMIN — NICOTINE 1 PATCH: 21 PATCH, EXTENDED RELEASE TRANSDERMAL at 11:10

## 2021-07-21 RX ADMIN — CYCLOBENZAPRINE 10 MG: 10 TABLET, FILM COATED ORAL at 00:58

## 2021-07-21 RX ADMIN — ASPIRIN 81 MG CHEWABLE TABLET 81 MG: 81 TABLET CHEWABLE at 09:16

## 2021-07-21 RX ADMIN — IPRATROPIUM BROMIDE AND ALBUTEROL SULFATE 3 ML: .5; 2.5 SOLUTION RESPIRATORY (INHALATION) at 00:10

## 2021-07-21 RX ADMIN — PREDNISONE 40 MG: 10 TABLET ORAL at 09:16

## 2021-07-21 RX ADMIN — BUDESONIDE 0.5 MG: 0.5 INHALANT ORAL at 06:14

## 2021-07-21 RX ADMIN — Medication 1 TABLET: at 09:16

## 2021-07-21 RX ADMIN — TRAMADOL HYDROCHLORIDE 50 MG: 50 TABLET, FILM COATED ORAL at 00:58

## 2021-07-21 RX ADMIN — AZITHROMYCIN 250 MG: 500 INJECTION, POWDER, LYOPHILIZED, FOR SOLUTION INTRAVENOUS at 09:16

## 2021-07-21 RX ADMIN — IPRATROPIUM BROMIDE AND ALBUTEROL SULFATE 3 ML: .5; 2.5 SOLUTION RESPIRATORY (INHALATION) at 12:00

## 2021-07-21 RX ADMIN — ARFORMOTEROL TARTRATE 15 MCG: 15 SOLUTION RESPIRATORY (INHALATION) at 06:14

## 2021-07-21 NOTE — DISCHARGE SUMMARY
[facility]         HOSPITALIST  DISCHARGE SUMMARY    Patient Name: Adela Nicole  : 1955  MRN: [id number]    Date of Admission: 2021  Date of Discharge:  21  Primary Care Physician: Yamileth Lugo APRN    Consults     Date and Time Order Name Status Description    2021  5:24 PM Inpatient Hospitalist Consult Completed           Active and Resolved Hospital Problems:  Active Hospital Problems    Diagnosis POA   • COPD exacerbation (CMS/[facility]) [J44.1] Yes      Resolved Hospital Problems   No resolved problems to display.       Hospital Course     Hospital Course:  Adela Nicole is a 66 y.o. female with a history of COPD, osteoarthritis, gastric ulcers, anemia, rib fractures, brain aneurysm, partial colectomy who presents with a 4-5 day history of shortness of breath and left rib pain associated with coughing.  Patient states her cough is productive of light yellow sputum.  She complains of left chest wall pain and her ribs which is how she felt in the past with rib fractures from coughing.  She states that few days ago she was reaching for some blackberries and overextended her arm and felt a pop in her ribs where she is now feeling pain.  Pain occurs now every time she coughs. Patient denies fever, sore throat, hematuria, abdominal pain, dysuria, nausea, vomiting, extremity swelling, or other complaint.  Patient smokes 15 cigarettes a day.  Patient has not had the Covid vaccine but has not had any known exposures to COVID-19. On arrival to the ED she was found to be hypoxic on room air requiring 2 L cannula.  CT scan consistent with left eighth rib fracture and concern for pneumonia.  She started on Rocephin and azithromycin.  Infectious work-up was negative but she was transitioned to Levaquin to complete 7 days of therapy.  She was treated with oral steroids, frequent bronchodilators.  She was able to be weaned off of oxygen and passed a walk test prior to  discharge.  Patient was discharged home in stable condition on 7/21/2021 to complete a course of antibiotics and steroids.  She was also set up with a nebulizer at home, Breo and albuterol for home use.  Given incentive spirometer to continue at home.  Long discussion regarding tobacco cessation, the patient states she is willing to quit.  She was prescribed nicotine patches at discharge.  Recommend follow-up with PCP in 1 week, pulmonology in 1 month.      Day of Discharge     Vital Signs:  Temp:  [97.4 °F (36.3 °C)-97.9 °F (36.6 °C)] 97.7 °F (36.5 °C)  Heart Rate:  [73-93] 83  Resp:  [16-20] 20  BP: (100-125)/(44-59) 104/50  Flow (L/min):  [1] 1  Physical Exam:   Gen: NAD, WDWN  ENT: PERRL, EOMI   CV: RRR no MRG  Pulm: Minimal expiratory wheezing in bilateral left lobe, otherwise clear  GI: Abd soft, NTND, +bs  Neuro: Moving all extremities spontaneously, CN II-XII grossly intact   Psych: A&O*3, normal mood and affect  Skin: No lesions or rashes noted      Discharge Details        Discharge Medications      New Medications      Instructions Start Date   albuterol (2.5 MG/3ML) 0.083% nebulizer solution  Commonly known as: PROVENTIL   2.5 mg, Nebulization, Every 6 Hours PRN      Breo Ellipta 200-25 MCG/INH inhaler  Generic drug: Fluticasone Furoate-Vilanterol   1 puff, Inhalation, Daily - RT      levoFLOXacin 750 MG tablet  Commonly known as: Levaquin   750 mg, Oral, Daily      nicotine 21 MG/24HR patch  Commonly known as: NICODERM CQ   1 patch, Transdermal, Every 24 Hours Scheduled   Start Date: July 22, 2021     predniSONE 20 MG tablet  Commonly known as: DELTASONE   40 mg, Oral, Daily With Breakfast   Start Date: July 22, 2021        Continue These Medications      Instructions Start Date   acetaminophen 650 MG 8 hr tablet  Commonly known as: TYLENOL   650 mg, Oral, Every 8 Hours PRN      aspirin 81 MG chewable tablet   81 mg, Oral, Daily      benzonatate 100 MG capsule  Commonly known as: TESSALON   100 mg,  Oral, 3 Times Daily PRN      Calcium Citrate-Vitamin D3 315-250 MG-UNIT tablet   1 tablet, Oral, Daily      cyclobenzaprine 10 MG tablet  Commonly known as: FLEXERIL   10 mg, Oral, 3 Times Daily PRN      diclofenac sodium 100 MG 24 hr tablet  Commonly known as: VOTAREN XR   100 mg, Oral, Daily      PROLIA SC   60 mg, Subcutaneous, Every 6 Months      traMADol 50 MG tablet  Commonly known as: ULTRAM   50 mg, Oral, Every 8 Hours PRN             Allergies   Allergen Reactions   • Codeine Unknown (See Comments)     Unknown        Discharge Disposition:  Home or Self Care    Diet:  Hospital:  Diet Order   Procedures   • Diet Regular       Discharge Activity:   Activity Instructions     Activity as Tolerated            CODE STATUS:  Code Status and Medical Interventions:   Ordered at: 07/19/21 1919     Code Status:    CPR     Medical Interventions (Level of Support Prior to Arrest):    Full         No future appointments.    Additional Instructions for the Follow-ups that You Need to Schedule     Discharge Follow-up with PCP   As directed       Currently Documented PCP:    Yamileth Lugo APRN    PCP Phone Number:    [phone]     Follow Up Details: 3-5 days         Discharge Follow-up with Specified Provider: Dr Juarez; 1 Month   As directed      To: Dr Juarez    Follow Up: 1 Month               Pertinent  and/or Most Recent Results     PROCEDURES:   None    LAB RESULTS:      Lab 07/21/21  0525 07/20/21  0506 07/19/21  1337   WBC 7.46 5.57 5.62   HEMOGLOBIN 11.9* 13.2 13.3   HEMATOCRIT 35.9 38.9 40.5   PLATELETS 237 249 245   NEUTROS ABS 5.44 4.75 3.85   IMMATURE GRANS (ABS) 0.02 0.02 0.01   LYMPHS ABS 1.41 0.49* 1.15   MONOS ABS 0.51 0.30 0.45   EOS ABS 0.05 0.00 0.12   MCV 91.6 90.7 91.6   PROCALCITONIN  --  0.03  --          Lab 07/21/21  0525 07/20/21  0506 07/19/21  1337   SODIUM 140 137 134*   POTASSIUM 3.8 4.6 3.7   CHLORIDE 107 104 100   CO2 23.7 23.7 25.8   ANION GAP 9.3 9.3 8.2   BUN 16 15 13    CREATININE 0.81 0.83 0.78   GLUCOSE 96 192* 100*   CALCIUM 8.5* 8.6 8.1*   MAGNESIUM 2.1  --   --          Lab 07/19/21  1337   TOTAL PROTEIN 6.8   ALBUMIN 4.10   GLOBULIN 2.7   ALT (SGPT) 22   AST (SGOT) 34*   BILIRUBIN 0.7   ALK PHOS 109         Lab 07/19/21  1337   PROBNP 69.7   TROPONIN T <0.010                 Brief Urine Lab Results     None        Microbiology Results (last 10 days)     Procedure Component Value - Date/Time    Legionella Antigen, Urine - Urine, Urine, Clean Catch [[id number]]  (Normal) Collected: 07/20/21 1201    Lab Status: Final result Specimen: Urine, Clean Catch Updated: 07/20/21 1320     LEGIONELLA ANTIGEN, URINE Negative    S. Pneumo Ag Urine or CSF - Urine, Urine, Clean Catch [[id number]]  (Normal) Collected: 07/20/21 1201    Lab Status: Final result Specimen: Urine, Clean Catch Updated: 07/20/21 1322     Strep Pneumo Ag Negative                           Time spent on Discharge including face to face service:  31 minutes    Electronically signed by Rolando Driscoll MD, 07/21/21, 11:43 AM EDT.

## 2021-07-21 NOTE — PLAN OF CARE
Goal Outcome Evaluation:  Plan of Care Reviewed With: patient        Progress: improving  Outcome Summary: pt aox4, pain to left side relieved with tramadol. pt ambulated 260 ft in [place] today on room air. ready for discharge

## 2021-07-21 NOTE — THERAPY EVALUATION
Acute Care - Physical Therapy Initial Evaluation   [facility]     Patient Name: Adela Nicole  : 1955  MRN: [id number]     Today's Date: 2021     Admit date: 2021     Referring Physician: Carolina att. providers found     SurgeryDate:* No surgery found *          Visit Dx:     ICD-10-CM ICD-9-CM   1. COPD exacerbation (CMS/HCC)  J44.1 491.21   2. Closed fracture of one rib of left side, initial encounter  S22.32XA 807.01   3. Difficulty in walking  R26.2 719.7     Patient Active Problem List   Diagnosis   • Chronic radicular lumbar pain   • Primary osteoarthritis of right hip   • Primary osteoarthritis of left knee   • Primary osteoarthritis of right knee   • COPD exacerbation (CMS/HCC)     Past Medical History:   Diagnosis Date   • Anemia    • Arthritis    • Bleeding gastric ulcer    • Brain aneurysm    • H/O cerebral aneurysm repair    • Osteoporosis      Past Surgical History:   Procedure Laterality Date   • CHOLECYSTECTOMY     • COLON SURGERY          PT Assessment (last 12 hours)      PT Evaluation and Treatment     Row Name 21 1400          Physical Therapy Time and Intention    Subjective Information  no complaints  -ASHUTOSH     Document Type  evaluation  -ASHUTOSH     Mode of Treatment  individual therapy;physical therapy  -ASHUTOSH     Patient Effort  excellent  -ASHUTOSH     Symptoms Noted During/After Treatment  none  -ASHUTOSH     Row Name 21 1400          General Information    Patient Profile Reviewed  yes  -ASHUTOSH     Patient Observations  agree to therapy;cooperative;alert  -ASHUTOSH     Prior Level of Function  independent:;ADL's;all household mobility  -ASHUTOSH     Equipment Currently Used at Home  cane, straight  -ASHUTOSH     Existing Precautions/Restrictions  fall  -ASHUTOSH     Risks Reviewed  patient:  -ASHUTOSH     Benefits Reviewed  patient:  -ASHUTOSH     Barriers to Rehab  none identified  -ASHUTOSH     Row Name 21 1400          Living Environment    Current Living Arrangements  home/apartment/condo  -ASHUTOSH     Home Accessibility   stairs to enter home  -ASHUTOSH     Lives With  spouse  -ASHUTOSH     Row Name 07/21/21 1400          Home Main Entrance    Number of Stairs, Main Entrance  five  -ASHUTOSH     Stair Railings, Main Entrance  railings safe and in good condition  -ASHUTOSH     Row Name 07/21/21 1400          Range of Motion (ROM)    Range of Motion  bilateral lower extremities;ROM is WFL  -ASHUTOSH     Row Name 07/21/21 1400          Strength (Manual Muscle Testing)    Strength (Manual Muscle Testing)  bilateral lower extremities;strength is WFL;other (see comments) BLE MMT: 4-/5  -ASHUTOSH     Row Name 07/21/21 1400          Bed Mobility    Bed Mobility  supine-sit  -ASHUTOSH     Supine-Sit [place] (Bed Mobility)  standby assist  -ASHUTOSH     Assistive Device (Bed Mobility)  head of bed elevated;bed rails  -ASHUTOSH     Row Name 07/21/21 1400          Transfers    Transfers  bed-chair transfer;sit-stand transfer;stand-sit transfer;toilet transfer  -ASHUTOSH     Bed-Chair [place] (Transfers)  contact guard  -ASHUTOSH     Assistive Device (Bed-Chair Transfers)  cane, straight  -ASHUTOSH     Sit-Stand [place] (Transfers)  contact guard  -ASHUTOSH     Stand-Sit [place] (Transfers)  contact guard  -ASHUTOSH     [place] Level (Toilet Transfer)  standby assist  -ASHUTOSH     Assistive Device (Toilet Transfer)  cane, straight  -ASHUTOSH     Row Name 07/21/21 1400          Sit-Stand Transfer    Assistive Device (Sit-Stand Transfers)  cane, straight  -ASHUTOSH     Row Name 07/21/21 1400          Stand-Sit Transfer    Assistive Device (Stand-Sit Transfers)  cane, straight  -ASHUTOSH     Row Name 07/21/21 1400          Toilet Transfer    Type (Toilet Transfer)  stand-sit;sit-stand  -ASHUTOSH     Row Name 07/21/21 1400          Gait/Stairs (Locomotion)    Gait/Stairs Locomotion  gait/ambulation assistive device  -ASHUTOSH     [place] Level (Gait)  contact guard  -ASHUTOSH     Assistive Device (Gait)  cane, straight  -ASHUTOSH     Distance in Feet (Gait)  200  -ASHUTOSH     Row Name 07/21/21 1400          Balance    Balance Assessment  sitting dynamic  balance;sit to stand dynamic balance;standing dynamic balance  -ASHUTOSH     Dynamic Sitting Balance  WFL  -ASHUTOSH     Sit to Stand Dynamic Balance  WFL  -ASHUTOSH     Dynamic Standing Balance  WFL  -ASHUTOSH     Row Name 07/21/21 1400          Plan of Care Review    Plan of Care Reviewed With  patient  -ASHUTOSH     Progress  improving  -ASHUTOSH     Outcome Summary  Patient is a 67 y/o female admitted to the hospital for noted diagnosis. She performs functional mobility tasks safe and efficiently. Her monitored O2 sat remained >95% during her entire ambulatory period. Skilled PT interventions are not recommended at this time, although she will benefit from pulmonary outpatient services to address activity tolerance concerns.    -ASHUTOSH     Row Name 07/21/21 1400          Vital Signs    Pre SpO2 (%)  95  -ASHUTOSH     O2 Delivery Pre Treatment  room air  -ASHUTOSH     Intra SpO2 (%)  96  -ASHUTOSH     O2 Delivery Intra Treatment  room air  -ASHUTOSH     Post SpO2 (%)  95  -ASHUTOSH     O2 Delivery Post Treatment  room air  -ASHUTOSH     Row Name 07/21/21 1400          Therapy Assessment/Plan (PT)    Criteria for Skilled Interventions Met (PT)  no problems identified which require skilled intervention  -ASHUTOSH     Row Name 07/21/21 1400          PT Evaluation Complexity    History, PT Evaluation Complexity  no personal factors and/or comorbidities  -ASHUTOSH     Examination of Body Systems (PT Eval Complexity)  total of 4 or more elements  -ASHUTOSH     Clinical Presentation (PT Evaluation Complexity)  stable  -ASHUTOSH     Clinical Decision Making (PT Evaluation Complexity)  low complexity  -ASHUTOSH     Overall Complexity (PT Evaluation Complexity)  low complexity  -ASHUTOSH     Row Name 07/21/21 1400          Therapy Plan Review/Discharge Plan (PT)    Therapy Plan Review (PT)  evaluation/treatment results reviewed;patient  -ASHUTOSH       User Cage  (r) = Recorded By, (t) = Taken By, (c) = Cosigned By    Initials Name Provider Type    Steve Cunha, PT Physical Therapist        Physical Therapy Education                  Title: PT OT SLP Therapies (Done)     Topic: Physical Therapy (Done)     Point: Mobility training (Done)     Learning Progress Summary           Patient Acceptance, E, DU,VU by ASHUTOSH at 7/21/2021 1425                   Point: Home exercise program (Done)     Learning Progress Summary           Patient Acceptance, E, DU,VU by ASHUTOSH at 7/21/2021 1425                   Point: Body mechanics (Done)     Learning Progress Summary           Patient Acceptance, E, DU,VU by ASHUTOSH at 7/21/2021 1425                   Point: Precautions (Done)     Learning Progress Summary           Patient Acceptance, E, DU,VU by ASHUTOSH at 7/21/2021 1425                               User Key     Initials Effective Dates Name Provider Type Discipline    ASHUTOSH 06/03/21 -  Steve Lovell PT Physical Therapist PT              PT Recommendation and Plan  Anticipated Discharge Disposition (PT): home with outpatient therapy services (pulmonary rehab services)  Therapy Frequency (PT): evaluation only  Plan of Care Reviewed With: patient  Progress: improving  Outcome Summary: Patient is a 67 y/o female admitted to the hospital for noted diagnosis. She performs functional mobility tasks safe and efficiently. Her monitored O2 sat remained >95% during her entire ambulatory period. Skilled PT interventions are not recommended at this time, although she will benefit from pulmonary outpatient services to address activity tolerance concerns.    Outcome Measures     Row Name 07/21/21 1400             How much help from another person do you currently need...    Turning from your back to your side while in flat bed without using bedrails?  4  -ASHUTOSH      Moving from lying on back to sitting on the side of a flat bed without bedrails?  4  -ASHUTOSH      Moving to and from a bed to a chair (including a wheelchair)?  4  -ASHUTOSH      Standing up from a chair using your arms (e.g., wheelchair, bedside chair)?  4  -ASHUTOSH      Climbing 3-5 steps with a railing?  4  -ASHUTOSH      To walk in hospital  room?  4  -ASHUTOSH      AM-PAC 6 Clicks Score (PT)  24  -ASHUTOSH         Functional Assessment    Outcome Measure Options  AM-PAC 6 Clicks Basic Mobility (PT)  -ASHUTOSH        User Key  (r) = Recorded By, (t) = Taken By, (c) = Cosigned By    Initials Name Provider Type    Steve Cunha, NEPTALI Physical Therapist           Time Calculation:   PT Charges     Row Name 07/21/21 1426             Time Calculation    PT Received On  07/21/21  -ASHUTOSH         Untimed Charges    PT Eval/Re-eval Minutes  55  -ASHUTOSH         Total Minutes    Untimed Charges Total Minutes  55  -ASHUTOSH       Total Minutes  55  -ASHUTOSH        User Key  (r) = Recorded By, (t) = Taken By, (c) = Cosigned By    Initials Name Provider Type    Steve Cunha PT Physical Therapist        Therapy Charges for Today     Code Description Service Date Service Provider Modifiers Qty    57373227442 HC PT EVAL LOW COMPLEXITY 4 7/21/2021 Steve Lovell, PT GP 1          PT G-Codes  Outcome Measure Options: AM-PAC 6 Clicks Basic Mobility (PT)  AM-PAC 6 Clicks Score (PT): 24    Steve Lovell, PT  7/21/2021

## 2021-07-21 NOTE — PLAN OF CARE
Goal Outcome Evaluation:  Plan of Care Reviewed With: patient        Progress: improving  Outcome Summary: Patient is a 67 y/o female admitted to the hospital for noted diagnosis. She performs functional mobility tasks safe and efficiently. Her monitored O2 sat remained >95% during her entire ambulatory period. Skilled PT interventions are not recommended at this time, although she will benefit from pulmonary outpatient services to address activity tolerance concerns.

## 2021-07-21 NOTE — NURSING NOTE
Exercise Oximetry    Patient Name:Adela Nicole   MRN: [id number]   Date: 07/21/21             ROOM AIR BASELINE   SpO2% 96   Heart Rate 78   Blood Pressure      EXERCISE ON ROOM AIR SpO2% EXERCISE ON O2 @  LPM SpO2%   1 MINUTE 96 1 MINUTE    2 MINUTES 97 2 MINUTES    3 MINUTES 96 3 MINUTES    4 MINUTES 95 4 MINUTES    5 MINUTES 96 5 MINUTES    6 MINUTES 96 6 MINUTES               Distance Walked  260 ft Distance Walked   Dyspnea ([place] Scale)   Dyspnea ([place] Scale)   Fatigue ([place] Scale)   Fatigue ([place] Scale)   SpO2% Post Exercise   SpO2% Post Exercise   HR Post Exercise   HR Post Exercise   Time to Recovery   Time to Recovery     Comments:

## 2021-07-21 NOTE — PLAN OF CARE
Goal Outcome Evaluation:              Outcome Summary: Reported pain unrelieved by tramadol. MD notified. Refused one time dose of ordered morphine. Unable to produce sputum sample. Educated on effects of smoking, reported no desire to quit.

## 2021-07-22 ENCOUNTER — READMISSION MANAGEMENT (OUTPATIENT)
Dept: CALL CENTER | Facility: HOSPITAL | Age: 66
End: 2021-07-22

## 2021-07-22 NOTE — OUTREACH NOTE
Prep Survey      Responses   [facility] patient discharged from?  [facility]   Is LACE score < 7 ?  No   Emergency Room discharge w/ pulse ox?  No   Eligibility  Readm Mgmt   Discharge diagnosis  COPD exacerbation   Does the patient have one of the following disease processes/diagnoses(primary or secondary)?  COPD/Pneumonia   Does the patient have Home health ordered?  No   Is there a DME ordered?  Yes   What DME was ordered?  [employer] for nebulizer   Comments regarding appointments  Listed   Prep survey completed?  Yes          Sissy Griffith RN

## 2021-07-24 ENCOUNTER — READMISSION MANAGEMENT (OUTPATIENT)
Dept: CALL CENTER | Facility: HOSPITAL | Age: 66
End: 2021-07-24

## 2021-07-24 NOTE — OUTREACH NOTE
COPD/PN Week 1 Survey      Responses   [facility] patient discharged from?  [facility]   Does the patient have one of the following disease processes/diagnoses(primary or secondary)?  COPD/Pneumonia   Was the primary reason for admission:  COPD exacerbation   Week 1 attempt successful?  No   Unsuccessful attempts  Attempt 1   Discharge diagnosis  COPD exacerbation          [place] Vega RN

## 2021-07-29 ENCOUNTER — READMISSION MANAGEMENT (OUTPATIENT)
Dept: CALL CENTER | Facility: HOSPITAL | Age: 66
End: 2021-07-29

## 2021-07-29 NOTE — OUTREACH NOTE
COPD/PN Week 1 Survey      Responses   [facility] patient discharged from?  [facility]   Does the patient have one of the following disease processes/diagnoses(primary or secondary)?  COPD/Pneumonia   Was the primary reason for admission:  COPD exacerbation   Week 1 attempt successful?  No   Unsuccessful attempts  Attempt 2          Allie Pace RN

## 2021-08-03 ENCOUNTER — READMISSION MANAGEMENT (OUTPATIENT)
Dept: CALL CENTER | Facility: HOSPITAL | Age: 66
End: 2021-08-03

## 2021-08-03 NOTE — OUTREACH NOTE
COPD/PN Week 2 Survey      Responses   [facility] patient discharged from?  [facility]   Does the patient have one of the following disease processes/diagnoses(primary or secondary)?  COPD/Pneumonia   Was the primary reason for admission:  COPD exacerbation   Week 2 attempt successful?  No   Unsuccessful attempts  Attempt 1          Allie Pace RN

## 2022-02-03 ENCOUNTER — PREP FOR SURGERY (OUTPATIENT)
Dept: OTHER | Facility: HOSPITAL | Age: 67
End: 2022-02-03

## 2022-02-03 ENCOUNTER — CLINICAL SUPPORT (OUTPATIENT)
Dept: GASTROENTEROLOGY | Facility: CLINIC | Age: 67
End: 2022-02-03

## 2022-02-03 DIAGNOSIS — Z83.71 ENCOUNTER FOR COLONOSCOPY IN PATIENT WITH FAMILY HISTORY OF COLON POLYPS: Primary | ICD-10-CM

## 2022-02-03 DIAGNOSIS — Z12.11 ENCOUNTER FOR COLONOSCOPY IN PATIENT WITH FAMILY HISTORY OF COLON POLYPS: Primary | ICD-10-CM

## 2022-02-03 NOTE — PROGRESS NOTES
Adela Nicole     REASON FOR CALL-colonoscopy, history of colon polyps, dr. bruno about 6 years ago    SENT IN PREP-[facility]  DOS-04/11/2022  Past Medical History:   Diagnosis Date   • Anemia    • Arthritis    • Bleeding gastric ulcer    • Brain aneurysm    • H/O cerebral aneurysm repair    • Osteoporosis      Allergies   Allergen Reactions   • Codeine Unknown (See Comments)     Unknown      Past Surgical History:   Procedure Laterality Date   • CHOLECYSTECTOMY     • COLON SURGERY     • COLONOSCOPY  2016    dr. luque   • SACROCOCCYGEAL ULCER REMOVAL       Social History     Socioeconomic History   • Marital status:    Tobacco Use   • Smoking status: Former Smoker     Packs/day: 0.50     Types: Cigars   • Smokeless tobacco: Never Used   • Tobacco comment: quit last july 2021   Vaping Use   • Vaping Use: Never used   Substance and Sexual Activity   • Alcohol use: Never   • Drug use: Never   • Sexual activity: Defer     History reviewed. No pertinent family history.    Current Outpatient Medications:   •  acetaminophen (TYLENOL) 650 MG 8 hr tablet, Take 650 mg by mouth Every 8 (Eight) Hours As Needed for Mild Pain ., Disp: , Rfl:   •  albuterol (PROVENTIL) (2.5 MG/3ML) 0.083% nebulizer solution, Take 2.5 mg by nebulization Every 6 (Six) Hours As Needed for Wheezing or Shortness of Air., Disp: 120 each, Rfl: 0  •  aspirin 81 MG chewable tablet, Chew 81 mg Daily., Disp: , Rfl:   •  Calcium Citrate-Vitamin D3 315-250 MG-UNIT tablet, Take 1 tablet by mouth Daily., Disp: , Rfl:   •  cyclobenzaprine (FLEXERIL) 10 MG tablet, Take 10 mg by mouth 3 (Three) Times a Day As Needed for Muscle Spasms., Disp: , Rfl:   •  Denosumab (PROLIA SC), Inject 60 mg under the skin into the appropriate area as directed Every 6 (Six) Months., Disp: , Rfl:   •  diclofenac sodium (VOTAREN XR) 100 MG 24 hr tablet, Take 100 mg by mouth Daily., Disp: , Rfl:   •  traMADol (ULTRAM) 50 MG tablet, Take 50 mg by mouth Every 8 (Eight) Hours  As Needed for Moderate Pain ., Disp: , Rfl:   •  benzonatate (TESSALON) 100 MG capsule, Take 1 capsule by mouth 3 (Three) Times a Day As Needed for Cough., Disp: 45 capsule, Rfl: 0  •  Fluticasone Furoate-Vilanterol (Breo Ellipta) 200-25 MCG/INH inhaler, Inhale 1 puff Daily., Disp: 1 each, Rfl: 0  •  levoFLOXacin (Levaquin) 750 MG tablet, Take 1 tablet by mouth Daily., Disp: 4 tablet, Rfl: 0  •  nicotine (NICODERM CQ) 21 MG/24HR patch, Place 1 patch on the skin as directed by provider Daily., Disp: 7 patch, Rfl: 0    Current Facility-Administered Medications:   •  albuterol sulfate HFA (PROVENTIL HFA;VENTOLIN HFA;PROAIR HFA) inhaler 2 puff, 2 puff, Inhalation, 4x Daily - RT, Rolando Bird MD

## 2022-03-10 ENCOUNTER — TELEPHONE (OUTPATIENT)
Dept: GASTROENTEROLOGY | Facility: CLINIC | Age: 67
End: 2022-03-10

## 2022-03-10 NOTE — TELEPHONE ENCOUNTER
Patient called and has colon on 4/11/22 but did not  her bowel prep. Pharmacy was confirmed at this time, please re-send bowel prep to this pharmacy.

## 2022-03-11 RX ORDER — POLYETHYLENE GLYCOL-3350 AND ELECTROLYTES WITH FLAVOR PACK 240; 5.84; 2.98; 6.72; 22.72 G/278.26G; G/278.26G; G/278.26G; G/278.26G; G/278.26G
4000 POWDER, FOR SOLUTION ORAL ONCE
Qty: 4000 ML | Refills: 0 | Status: SHIPPED | OUTPATIENT
Start: 2022-03-11 | End: 2022-03-11

## 2022-04-07 NOTE — PRE-PROCEDURE INSTRUCTIONS
Pt. Instructed on laxative and skin prep, pre-op meds, clear liquid diet. Ok to take Flexeril,Tramadol  a.m. of procedure.

## 2022-04-11 ENCOUNTER — HOSPITAL ENCOUNTER (OUTPATIENT)
Facility: HOSPITAL | Age: 67
Setting detail: HOSPITAL OUTPATIENT SURGERY
Discharge: HOME | End: 2022-04-11
Attending: INTERNAL MEDICINE | Admitting: INTERNAL MEDICINE

## 2022-04-11 ENCOUNTER — ANESTHESIA (OUTPATIENT)
Dept: GASTROENTEROLOGY | Facility: HOSPITAL | Age: 67
End: 2022-04-11

## 2022-04-11 ENCOUNTER — ANESTHESIA EVENT (OUTPATIENT)
Dept: GASTROENTEROLOGY | Facility: HOSPITAL | Age: 67
End: 2022-04-11

## 2022-04-11 VITALS
RESPIRATION RATE: 20 BRPM | HEART RATE: 84 BPM | HEIGHT: 65 IN | TEMPERATURE: 97.8 F | SYSTOLIC BLOOD PRESSURE: 120 MMHG | BODY MASS INDEX: 25.71 KG/M2 | OXYGEN SATURATION: 99 % | DIASTOLIC BLOOD PRESSURE: 91 MMHG | WEIGHT: 154.32 LBS

## 2022-04-11 PROCEDURE — G0105 COLORECTAL SCRN; HI RISK IND: HCPCS | Performed by: INTERNAL MEDICINE

## 2022-04-11 PROCEDURE — 25010000002 PROPOFOL 10 MG/ML EMULSION: Performed by: NURSE ANESTHETIST, CERTIFIED REGISTERED

## 2022-04-11 RX ORDER — SODIUM CHLORIDE, SODIUM LACTATE, POTASSIUM CHLORIDE, CALCIUM CHLORIDE 600; 310; 30; 20 MG/100ML; MG/100ML; MG/100ML; MG/100ML
30 INJECTION, SOLUTION INTRAVENOUS CONTINUOUS
Status: DISCONTINUED | OUTPATIENT
Start: 2022-04-11 | End: 2022-04-11 | Stop reason: HOSPADM

## 2022-04-11 RX ORDER — PROPOFOL 10 MG/ML
VIAL (ML) INTRAVENOUS AS NEEDED
Status: DISCONTINUED | OUTPATIENT
Start: 2022-04-11 | End: 2022-04-11 | Stop reason: SURG

## 2022-04-11 RX ORDER — LIDOCAINE HYDROCHLORIDE 20 MG/ML
INJECTION, SOLUTION INFILTRATION; PERINEURAL AS NEEDED
Status: DISCONTINUED | OUTPATIENT
Start: 2022-04-11 | End: 2022-04-11 | Stop reason: SURG

## 2022-04-11 RX ADMIN — SODIUM CHLORIDE, POTASSIUM CHLORIDE, SODIUM LACTATE AND CALCIUM CHLORIDE 30 ML/HR: 600; 310; 30; 20 INJECTION, SOLUTION INTRAVENOUS at 11:28

## 2022-04-11 RX ADMIN — PROPOFOL 100 MG: 10 INJECTION, EMULSION INTRAVENOUS at 12:02

## 2022-04-11 RX ADMIN — LIDOCAINE HYDROCHLORIDE 50 MG: 20 INJECTION, SOLUTION INFILTRATION; PERINEURAL at 12:02

## 2022-04-11 RX ADMIN — PROPOFOL 175 MCG/KG/MIN: 10 INJECTION, EMULSION INTRAVENOUS at 12:02

## 2022-04-11 NOTE — ANESTHESIA PREPROCEDURE EVALUATION
Anesthesia Evaluation     Patient summary reviewed and Nursing notes reviewed   no history of anesthetic complications:  NPO Solid Status: > 8 hours  NPO Liquid Status: > 2 hours           Airway   Mallampati: III  TM distance: >3 FB  Possible difficult intubation  Dental    (+) edentulous    Pulmonary - normal exam   (+) a smoker Former, COPD,   Cardiovascular - normal exam  Exercise tolerance: unable to assess    ECG reviewed        Neuro/Psych    ROS Comment: Brain aneurysm s/p repair  GI/Hepatic/Renal/Endo    (+)  PUD, GI bleeding , thyroid problem thyroid nodules    Musculoskeletal     (+) back pain, radiculopathy  Abdominal  - normal exam   Substance History - negative use     OB/GYN negative ob/gyn ROS         Other   arthritis, blood dyscrasia anemia,     ROS/Med Hx Other:                           Anesthesia Plan    ASA 3     general   (Total IV Anesthesia    Patient understands anesthesia not responsible for dental damage.  )  intravenous induction     Anesthetic plan, all risks, benefits, and alternatives have been provided, discussed and informed consent has been obtained with: patient.    Plan discussed with CRNA.        CODE STATUS:

## 2022-04-11 NOTE — H&P
"ScreeningPre Procedure History & Physical    Chief Complaint:   Screening     Subjective     HPI:   Screening     Past Medical History:   Past Medical History:   Diagnosis Date   • Anemia    • Arthritis    • Bleeding gastric ulcer    • Brain aneurysm    • H/O cerebral aneurysm repair    • Osteoporosis        Past Surgical History:  Past Surgical History:   Procedure Laterality Date   • CHOLECYSTECTOMY     • COLON SURGERY     • COLONOSCOPY  2016    dr. luque   • SACROCOCCYGEAL ULCER REMOVAL         Family History:  Family History   Problem Relation Age of Onset   • Malig Hyperthermia Neg Hx        Social History:   reports that she has quit smoking. Her smoking use included cigars. She smoked 0.50 packs per day. She has never used smokeless tobacco. She reports that she does not drink alcohol and does not use drugs.    Medications:   Medications Prior to Admission   Medication Sig Dispense Refill Last Dose   • acetaminophen (TYLENOL) 650 MG 8 hr tablet Take 650 mg by mouth Every 8 (Eight) Hours As Needed for Mild Pain .   Past Month at Unknown time   • aspirin 81 MG chewable tablet Chew 81 mg Daily.   Past Week at Unknown time   • Calcium Citrate-Vitamin D3 315-250 MG-UNIT tablet Take 1 tablet by mouth Daily.   Past Week at Unknown time   • cyclobenzaprine (FLEXERIL) 10 MG tablet Take 10 mg by mouth 3 (Three) Times a Day As Needed for Muscle Spasms.   4/11/2022 at Unknown time   • Denosumab (PROLIA SC) Inject 60 mg under the skin into the appropriate area as directed Every 6 (Six) Months.   Past Month at Unknown time   • traMADol (ULTRAM) 50 MG tablet Take 50 mg by mouth Every 8 (Eight) Hours As Needed for Moderate Pain .   4/11/2022 at Unknown time       Allergies:  Codeine        Objective     Blood pressure 138/81, pulse 96, temperature 97.6 °F (36.4 °C), temperature source Temporal, resp. rate 20, height 165.2 cm (65.04\"), weight 70 kg (154 lb 5.2 oz), SpO2 95 %.    Physical Exam   Constitutional: Pt is " oriented to person, place, and time and well-developed, well-nourished, and in no distress.   Mouth/Throat: Oropharynx is clear and moist.   Neck: Normal range of motion.   Cardiovascular: Normal rate, regular rhythm and normal heart sounds.    Pulmonary/Chest: Effort normal and breath sounds normal.   Abdominal: Soft. Nontender  Skin: Skin is warm and dry.   Psychiatric: Mood, memory, affect and judgment normal.     Assessment/Plan     Diagnosis:  Screening colonoscopy  H/o colon polyps     Anticipated Surgical Procedure:  colonoscopy    The risks, benefits, and alternatives of this procedure have been discussed with the patient or the responsible party- the patient understands and agrees to proceed.

## 2022-04-11 NOTE — ANESTHESIA POSTPROCEDURE EVALUATION
Patient: Adela Nicole    Procedure Summary     Date: 04/11/22 Room / Location: [facility] ENDOSCOPY 2 / [facility] ENDOSCOPY    Anesthesia Start: 1158 Anesthesia Stop: 1225    Procedure: COLONOSCOPY (N/A ) Diagnosis:       Encounter for colonoscopy in patient with family history of colon polyps      (Encounter for colonoscopy in patient with family history of colon polyps [Z12.11, Z83.71])    Surgeons: Dimitry Kuhn MD Provider: Jessica Carlson DO    Anesthesia Type: general ASA Status: 3          Anesthesia Type: general    Vitals  Vitals Value Taken Time   /91 04/11/22 1244   Temp 36.6 °C (97.8 °F) 04/11/22 1243   Pulse 84 04/11/22 1246   Resp 20 04/11/22 1243   SpO2 98 % 04/11/22 1246   Vitals shown include unvalidated device data.        Post Anesthesia Care and Evaluation    Patient location during evaluation: bedside  Patient participation: complete - patient participated  Level of consciousness: awake  Pain management: adequate  Airway patency: patent  Anesthetic complications: No anesthetic complications  PONV Status: none  Cardiovascular status: acceptable and stable  Respiratory status: acceptable  Hydration status: acceptable    Comments: An Anesthesiologist personally participated in the most demanding procedures (including induction and emergence if applicable) in the anesthesia plan, monitored the course of anesthesia administration at frequent intervals and remained physically present and available for immediate diagnosis and treatment of emergencies.

## (undated) DEVICE — Device: Brand: DEFENDO AIR/WATER/SUCTION AND BIOPSY VALVE

## (undated) DEVICE — COLON KIT: Brand: MEDLINE INDUSTRIES, INC.

## (undated) DEVICE — SOL IRRG H2O PL/BG 1000ML STRL